# Patient Record
Sex: FEMALE | Race: WHITE | NOT HISPANIC OR LATINO | URBAN - METROPOLITAN AREA
[De-identification: names, ages, dates, MRNs, and addresses within clinical notes are randomized per-mention and may not be internally consistent; named-entity substitution may affect disease eponyms.]

---

## 2017-11-29 ENCOUNTER — OUTPATIENT (OUTPATIENT)
Dept: OUTPATIENT SERVICES | Facility: HOSPITAL | Age: 67
LOS: 1 days | End: 2017-11-29

## 2018-01-08 PROBLEM — Z00.00 ENCOUNTER FOR PREVENTIVE HEALTH EXAMINATION: Status: ACTIVE | Noted: 2018-01-08

## 2018-01-15 ENCOUNTER — APPOINTMENT (OUTPATIENT)
Dept: ORTHOPEDIC SURGERY | Facility: CLINIC | Age: 68
End: 2018-01-15
Payer: MEDICARE

## 2018-01-15 VITALS
HEIGHT: 62 IN | WEIGHT: 110 LBS | SYSTOLIC BLOOD PRESSURE: 174 MMHG | DIASTOLIC BLOOD PRESSURE: 79 MMHG | BODY MASS INDEX: 20.24 KG/M2 | HEART RATE: 61 BPM

## 2018-01-15 DIAGNOSIS — Z86.39 PERSONAL HISTORY OF OTHER ENDOCRINE, NUTRITIONAL AND METABOLIC DISEASE: ICD-10-CM

## 2018-01-15 DIAGNOSIS — G60.9 HEREDITARY AND IDIOPATHIC NEUROPATHY, UNSPECIFIED: ICD-10-CM

## 2018-01-15 DIAGNOSIS — Z86.79 PERSONAL HISTORY OF OTHER DISEASES OF THE CIRCULATORY SYSTEM: ICD-10-CM

## 2018-01-15 DIAGNOSIS — Z85.828 PERSONAL HISTORY OF OTHER MALIGNANT NEOPLASM OF SKIN: ICD-10-CM

## 2018-01-15 DIAGNOSIS — Z78.9 OTHER SPECIFIED HEALTH STATUS: ICD-10-CM

## 2018-01-15 DIAGNOSIS — Z87.39 PERSONAL HISTORY OF OTHER DISEASES OF THE MUSCULOSKELETAL SYSTEM AND CONNECTIVE TISSUE: ICD-10-CM

## 2018-01-15 PROCEDURE — 20612 ASPIRATE/INJ GANGLION CYST: CPT | Mod: RT

## 2018-01-15 PROCEDURE — 99203 OFFICE O/P NEW LOW 30 MIN: CPT | Mod: 25

## 2018-01-15 RX ORDER — VALACYCLOVIR 500 MG/1
500 TABLET, FILM COATED ORAL
Qty: 30 | Refills: 0 | Status: ACTIVE | COMMUNITY
Start: 2017-03-30

## 2018-01-15 RX ORDER — NAPROXEN 500 MG/1
500 TABLET, DELAYED RELEASE ORAL
Qty: 60 | Refills: 0 | Status: ACTIVE | COMMUNITY
Start: 2017-03-30

## 2018-01-15 RX ORDER — METOPROLOL SUCCINATE 50 MG/1
50 TABLET, EXTENDED RELEASE ORAL
Qty: 90 | Refills: 0 | Status: ACTIVE | COMMUNITY
Start: 2017-08-23

## 2018-01-15 RX ORDER — CHLORHEXIDINE GLUCONATE, 0.12% ORAL RINSE 1.2 MG/ML
0.12 SOLUTION DENTAL
Qty: 473 | Refills: 0 | Status: ACTIVE | COMMUNITY
Start: 2017-11-06

## 2018-01-15 RX ORDER — TRAZODONE HYDROCHLORIDE 50 MG/1
50 TABLET ORAL
Qty: 30 | Refills: 0 | Status: ACTIVE | COMMUNITY
Start: 2017-12-11

## 2018-01-15 RX ORDER — ATORVASTATIN CALCIUM 10 MG/1
10 TABLET, FILM COATED ORAL
Refills: 0 | Status: ACTIVE | COMMUNITY

## 2018-10-29 PROCEDURE — 99284 EMERGENCY DEPT VISIT MOD MDM: CPT

## 2018-10-29 PROCEDURE — 71045 X-RAY EXAM CHEST 1 VIEW: CPT | Mod: 26

## 2018-10-30 ENCOUNTER — OUTPATIENT (OUTPATIENT)
Dept: OUTPATIENT SERVICES | Facility: HOSPITAL | Age: 68
LOS: 1 days | End: 2018-10-30

## 2018-10-30 ENCOUNTER — INPATIENT (INPATIENT)
Facility: HOSPITAL | Age: 68
LOS: 0 days | Discharge: HOME | End: 2018-10-30
Payer: MEDICARE

## 2018-10-30 PROCEDURE — 99223 1ST HOSP IP/OBS HIGH 75: CPT

## 2018-10-30 PROCEDURE — 70551 MRI BRAIN STEM W/O DYE: CPT | Mod: 26

## 2018-10-30 PROCEDURE — 70496 CT ANGIOGRAPHY HEAD: CPT | Mod: 26

## 2018-10-30 PROCEDURE — 70498 CT ANGIOGRAPHY NECK: CPT | Mod: 26

## 2018-11-05 ENCOUNTER — TRANSCRIPTION ENCOUNTER (OUTPATIENT)
Age: 68
End: 2018-11-05

## 2019-08-27 ENCOUNTER — APPOINTMENT (OUTPATIENT)
Dept: RADIOLOGY | Facility: CLINIC | Age: 69
End: 2019-08-27
Payer: MEDICARE

## 2019-08-27 PROCEDURE — 73620 X-RAY EXAM OF FOOT: CPT | Mod: RT

## 2020-09-01 ENCOUNTER — APPOINTMENT (OUTPATIENT)
Dept: MRI IMAGING | Facility: CLINIC | Age: 70
End: 2020-09-01
Payer: MEDICARE

## 2020-09-01 PROCEDURE — 73221 MRI JOINT UPR EXTREM W/O DYE: CPT | Mod: RT

## 2020-10-10 ENCOUNTER — APPOINTMENT (OUTPATIENT)
Dept: ULTRASOUND IMAGING | Facility: CLINIC | Age: 70
End: 2020-10-10

## 2020-10-10 ENCOUNTER — APPOINTMENT (OUTPATIENT)
Dept: CT IMAGING | Facility: CLINIC | Age: 70
End: 2020-10-10

## 2020-11-23 ENCOUNTER — APPOINTMENT (OUTPATIENT)
Dept: MAMMOGRAPHY | Facility: CLINIC | Age: 70
End: 2020-11-23

## 2020-11-23 ENCOUNTER — APPOINTMENT (OUTPATIENT)
Dept: RADIOLOGY | Facility: CLINIC | Age: 70
End: 2020-11-23

## 2021-07-22 ENCOUNTER — APPOINTMENT (OUTPATIENT)
Dept: ORTHOPEDIC SURGERY | Facility: CLINIC | Age: 71
End: 2021-07-22
Payer: MEDICARE

## 2021-07-22 VITALS — TEMPERATURE: 97.4 F | HEIGHT: 62 IN | BODY MASS INDEX: 17.66 KG/M2 | WEIGHT: 96 LBS

## 2021-07-22 DIAGNOSIS — F17.200 NICOTINE DEPENDENCE, UNSPECIFIED, UNCOMPLICATED: ICD-10-CM

## 2021-07-22 PROCEDURE — 99204 OFFICE O/P NEW MOD 45 MIN: CPT | Mod: 25

## 2021-07-22 PROCEDURE — 20550 NJX 1 TENDON SHEATH/LIGAMENT: CPT | Mod: F2

## 2021-07-22 PROCEDURE — 73130 X-RAY EXAM OF HAND: CPT | Mod: RT

## 2021-07-22 PROCEDURE — 73110 X-RAY EXAM OF WRIST: CPT | Mod: RT

## 2021-07-22 RX ORDER — AMOXICILLIN 500 MG/1
500 TABLET, FILM COATED ORAL
Qty: 21 | Refills: 0 | Status: DISCONTINUED | COMMUNITY
Start: 2017-11-06 | End: 2021-07-22

## 2021-07-22 RX ORDER — AZITHROMYCIN 250 MG/1
250 TABLET, FILM COATED ORAL
Qty: 6 | Refills: 0 | Status: DISCONTINUED | COMMUNITY
Start: 2017-11-29 | End: 2021-07-22

## 2021-07-22 RX ORDER — LOSARTAN POTASSIUM 50 MG/1
50 TABLET, FILM COATED ORAL
Refills: 0 | Status: ACTIVE | COMMUNITY

## 2021-07-22 RX ORDER — IBUPROFEN 800 MG/1
800 TABLET, FILM COATED ORAL
Qty: 21 | Refills: 0 | Status: DISCONTINUED | COMMUNITY
Start: 2017-11-06 | End: 2021-07-22

## 2021-07-22 RX ORDER — AMOXICILLIN AND CLAVULANATE POTASSIUM 875; 125 MG/1; MG/1
875-125 TABLET, COATED ORAL
Qty: 20 | Refills: 0 | Status: DISCONTINUED | COMMUNITY
Start: 2017-11-29 | End: 2021-07-22

## 2021-08-12 NOTE — DISCUSSION/SUMMARY
[FreeTextEntry1] : She has findings consistent with a left middle finger trigger finger.  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  At this time, I recommended a cortisone injection for the left middle finger trigger finger.  As her symptoms are tolerable at the right wrist, I have recommended observation at this time.\par \par The patient has agreed to this plan of management and has expressed full understanding.  All questions were fully answered to the patient's satisfaction.\par \par My cumulative time spent on this patient's visit included: Preparation for the visit, review of the medical records, review of pertinent diagnostic studies, examination and counseling of the patient on the above diagnosis, treatment plan and prognosis, orders of diagnostic tests, medications and/or appropriate procedures and documentation in the medical records of today's visit.

## 2021-08-12 NOTE — PROCEDURE
Called patient to let her know paperwork is complete, and can call the office regarding lab work when she gets the chance.    [FreeTextEntry1] : -  After a discussion of risks and benefits, the patient agreed to proceed with a cortisone injection.  \par -  Side: Left \par -  Finger: Middle finger trigger finger\par -  Medications: 0.5 cc of 1% Lidocaine and 1 cc of Celestone Soluspan, 6mg/cc, using sterile technique.\par -  Patient tolerated the procedure well, without complications.\par -  Patient was told that the symptoms may worsen for a day or two, and should then begin to improve. \par -  Instructions: Patient was instructed on activity modification for the next several days.\par -  Follow-up: Within 4 weeks to assess response to the injection.

## 2021-08-12 NOTE — HISTORY OF PRESENT ILLNESS
[Right] : right hand dominant [FreeTextEntry1] : She comes in today for evaluation of a left middle finger trigger finger for the past month.  She denies any injury.  She also has complaints of right wrist pain.  She states that she fractured her right wrist for 2 months ago and was treated with a cast for 10 weeks.  She subsequently underwent therapy for 5 to 6 months with some relief.  However, she continues to have pain at her right wrist and dysfunction.  She is much more bothered, however, by her left middle finger trigger finger.\par \par I saw her in January 2018 regarding a right wrist volar ganglion cyst which was aspirated.  She states that this no longer bothers her.

## 2021-08-12 NOTE — PHYSICAL EXAM
[de-identified] : - Constitutional: This is a female in no obvious distress.  \par - Psych: Patient is alert and oriented to person, place and time.  Patient has a normal mood and affect.\par - Cardiovascular: Normal pulses throughout the upper extremities.  No significant varicosities are noted in the upper extremities. \par - Neuro: Strength and sensation are intact throughout the upper extremities.  Patient has normal coordination.\par - Respiratory:  Patient exhibits no evidence of shortness of breath or difficulty breathing.\par - Skin: No rashes, lesions, or other abnormalities are noted in the upper extremities.\par \par ---\par \par Examination of her left hand demonstrates swelling and tenderness along the A1 pulley of the middle finger.  There is triggering to there is no contracture.  She is neurovascularly intact distally.\par \par Examination of her right wrist demonstrates swelling and tenderness along the CMC and STT joints of the thumb.  She is much more tender along the STT joint dorsally.  She also has mild tenderness along the dorsal wrist capsule.  There is no tenderness along the distal radius dorsally.  There is no evidence of a trigger finger.  She can flex the digits into the palm.  She is neurovascularly intact distally. 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  There is widening of the scapholunate interval with a DISI deformity.  There is no obvious prior fracture noted.  There is MCP joint arthritis some mild arthritis of the DIP joints of the digits.

## 2021-08-19 ENCOUNTER — APPOINTMENT (OUTPATIENT)
Dept: ORTHOPEDIC SURGERY | Facility: CLINIC | Age: 71
End: 2021-08-19
Payer: MEDICARE

## 2021-08-19 PROCEDURE — 99213 OFFICE O/P EST LOW 20 MIN: CPT | Mod: 25

## 2021-08-19 PROCEDURE — 20550 NJX 1 TENDON SHEATH/LIGAMENT: CPT | Mod: F2

## 2021-08-19 NOTE — PROCEDURE
[FreeTextEntry1] : -  After a discussion of risks and benefits, the patient agreed to proceed with a cortisone injection.  \par -  Side: Left \par -  Finger: Middle finger trigger finger\par -  Medications: 0.5 cc of 1% Lidocaine and 1 cc of Celestone Soluspan, 6mg/cc, using sterile technique.\par -  Patient tolerated the procedure well, without complications.\par -  Patient was told that the symptoms may worsen for a day or two, and should then begin to improve. \par -  Instructions: Patient was instructed on activity modification for the next several days.\par -  Follow-up: According to her symptoms.

## 2021-08-19 NOTE — PHYSICAL EXAM
[de-identified] : - Constitutional: This is a female in no obvious distress.  \par - Psych: Patient is alert and oriented to person, place and time.  Patient has a normal mood and affect.\par - Cardiovascular: Normal pulses throughout the upper extremities.  No significant varicosities are noted in the upper extremities. \par - Neuro: Strength and sensation are intact throughout the upper extremities.  Patient has normal coordination.\par - Respiratory:  Patient exhibits no evidence of shortness of breath or difficulty breathing.\par - Skin: No rashes, lesions, or other abnormalities are noted in the upper extremities.\par \par ---\par \par Examination of her left hand demonstrates decreased swelling and tenderness along the A1 pulley of the middle finger.  There is mild residual triggering.  She has full flexion and extension.  She is neurovascularly intact distally.\par \par Previous examination of her right wrist demonstrated swelling and tenderness along the CMC and STT joints of the thumb.  She is much more tender along the STT joint dorsally.  She also has mild tenderness along the dorsal wrist capsule.  There is no tenderness along the distal radius dorsally.  There is no evidence of a trigger finger.  She can flex the digits into the palm.  She is neurovascularly intact distally. 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  There is widening of the scapholunate interval with a DISI deformity.  There is no obvious prior fracture noted.  There is MCP joint arthritis some mild arthritis of the DIP joints of the digits.

## 2021-08-19 NOTE — DISCUSSION/SUMMARY
[FreeTextEntry1] : I had a discussion regarding today's visit, the diagnosis and treatment recommendations and options.  We also discussed changes since the last visit.  At this time, although she is improved, given her residual symptoms, she agreed to proceed with a repeat cortisone injection.\par \par The patient has agreed to the above plan of management and has expressed full understanding.  All questions were fully answered to the patient's satisfaction.\par \par My cumulative time spent on today's visit was greater than 30 minutes and included: Preparation for the visit, review of the medical records, review of pertinent diagnostic studies, examination and counseling of the patient on the above diagnosis, treatment plan and prognosis, orders of diagnostic tests, medications and/or appropriate procedures and documentation in the medical records of today's visit.

## 2021-08-19 NOTE — HISTORY OF PRESENT ILLNESS
[FreeTextEntry1] : 4 weeks status post left middle finger trigger cortisone injection #1. 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  I recommended symptomatic treatment for the right wrist.\par \par She is doing well and is 60 to 70% improved.  She still has some residual triggering.\par \par

## 2022-01-25 ENCOUNTER — NON-APPOINTMENT (OUTPATIENT)
Age: 72
End: 2022-01-25

## 2022-02-03 ENCOUNTER — APPOINTMENT (OUTPATIENT)
Dept: ORTHOPEDIC SURGERY | Facility: CLINIC | Age: 72
End: 2022-02-03
Payer: MEDICARE

## 2022-02-03 VITALS — TEMPERATURE: 97.1 F | BODY MASS INDEX: 17.66 KG/M2 | WEIGHT: 96 LBS | HEIGHT: 62 IN

## 2022-02-03 PROCEDURE — 20550 NJX 1 TENDON SHEATH/LIGAMENT: CPT | Mod: F2

## 2022-02-03 PROCEDURE — 99213 OFFICE O/P EST LOW 20 MIN: CPT | Mod: 25

## 2022-02-03 RX ADMIN — BETAMETHASONE ACETATE AND BETAMETHASONE SODIUM PHOSPHATE 6MG/ML PRESERVATIVE FREE MG/ML: 3; 3 INJECTION, SUSPENSION EPIDURAL; INTRAMUSCULAR; INTRASPINAL; INTRATHECAL at 00:00

## 2022-02-03 RX ADMIN — Medication %: at 00:00

## 2022-02-03 NOTE — DISCUSSION/SUMMARY
[FreeTextEntry1] : I had a discussion regarding today's visit, the diagnosis and treatment recommendations and options.  We also discussed changes since the last visit.  At this time, she opted to proceed with a repeat cortisone injection into the left middle finger. She understands that as this is her third injection, if this did not provide with long-term relief, then I would not recommend repeat injections in the future.\par \par The patient has agreed to the above plan of management and has expressed full understanding.  All questions were fully answered to the patient's satisfaction.\par \par My cumulative time spent on today's visit was greater than 30 minutes and included: Preparation for the visit, review of the medical records, review of pertinent diagnostic studies, examination and counseling of the patient on the above diagnosis, treatment plan and prognosis, orders of diagnostic tests, medications and/or appropriate procedures and documentation in the medical records of today's visit.

## 2022-02-03 NOTE — END OF VISIT
[FreeTextEntry3] : This note was written by Vince Post on 02/03/2022 acting solely as a scribe for Dr. Spenser Carcamo.\par  \par All medical record entries made by the Scribe were at my, Dr. Spenser Carcamo, direction and personally dictated by me on 02/03/2022. I have personally reviewed the chart and agree that the record accurately reflects my personal performance of the history, physical exam.

## 2022-02-03 NOTE — ADDENDUM
[FreeTextEntry1] : I, Vince Post, acted solely as a scribe for Dr. Carcamo on this date on 02/03/2022.

## 2022-02-03 NOTE — HISTORY OF PRESENT ILLNESS
[FreeTextEntry1] : Greater than 5 months status post left middle finger trigger cortisone injection #2.. 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  I recommended symptomatic treatment for the right wrist in the past.\par \par She returns today with left middle finger triggering, which returned 2 weeks ago. She rates her pain a 5 out of 10 at this time.

## 2022-02-03 NOTE — PHYSICAL EXAM
[de-identified] : - Constitutional: This is a female in no obvious distress.  \par - Psych: Patient is alert and oriented to person, place and time.  Patient has a normal mood and affect.\par - Cardiovascular: Normal pulses throughout the upper extremities.  No significant varicosities are noted in the upper extremities. \par - Neuro: Strength and sensation are intact throughout the upper extremities.  Patient has normal coordination.\par - Respiratory:  Patient exhibits no evidence of shortness of breath or difficulty breathing.\par - Skin: No rashes, lesions, or other abnormalities are noted in the upper extremities.\par \par ---\par \par Examination of her left hand demonstrates decreased swelling and tenderness along the A1 pulley of the middle finger. There is no obvious triggering noted, as she cannot flex to the point of triggering. There is no triggering of the other digits. She remains neurovascularly intact distally.\par \par Previous examination of her right wrist demonstrated swelling and tenderness along the CMC and STT joints of the thumb.  She is more tender along the STT joint dorsally.  She also has mild tenderness along the dorsal wrist capsule.  There is no tenderness along the distal radius dorsally.  There is no evidence of a trigger finger.  She can flex the digits into the palm.  She is neurovascularly intact distally. 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  There is widening of the scapholunate interval with a DISI deformity.  There is no obvious prior fracture noted.  There is MCP joint arthritis some mild arthritis of the DIP joints of the digits.

## 2022-12-14 ENCOUNTER — NON-APPOINTMENT (OUTPATIENT)
Age: 72
End: 2022-12-14

## 2022-12-15 PROBLEM — M19.031 ARTHRITIS OF RIGHT WRIST: Status: ACTIVE | Noted: 2021-08-12

## 2022-12-15 PROBLEM — M19.041 ARTHRITIS OF HAND, RIGHT: Status: ACTIVE | Noted: 2021-07-22

## 2022-12-22 ENCOUNTER — APPOINTMENT (OUTPATIENT)
Dept: ORTHOPEDIC SURGERY | Facility: CLINIC | Age: 72
End: 2022-12-22
Payer: MEDICARE

## 2022-12-22 DIAGNOSIS — S63.8X1A SPRAIN OF OTHER PART OF RIGHT WRIST AND HAND, INITIAL ENCOUNTER: ICD-10-CM

## 2022-12-22 DIAGNOSIS — M65.332 TRIGGER FINGER, LEFT MIDDLE FINGER: ICD-10-CM

## 2022-12-22 DIAGNOSIS — M19.031 PRIMARY OSTEOARTHRITIS, RIGHT WRIST: ICD-10-CM

## 2022-12-22 DIAGNOSIS — M67.431 GANGLION, RIGHT WRIST: ICD-10-CM

## 2022-12-22 DIAGNOSIS — M19.041 PRIMARY OSTEOARTHRITIS, RIGHT HAND: ICD-10-CM

## 2022-12-22 PROCEDURE — 99214 OFFICE O/P EST MOD 30 MIN: CPT | Mod: 25

## 2022-12-22 PROCEDURE — 20550 NJX 1 TENDON SHEATH/LIGAMENT: CPT | Mod: F8

## 2022-12-22 NOTE — HISTORY OF PRESENT ILLNESS
[FreeTextEntry1] : She comes in today for evaluation of her right ring finger trigger finger for the past 2 weeks.  At times her pain is 5 out of 10.\par \par She has been given 3 cortisone injections at her left middle finger trigger finger, the last 1 less than 11 months ago.  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

## 2022-12-22 NOTE — PHYSICAL EXAM
[de-identified] : - Constitutional: This is a female in no obvious distress.  \par - Psych: Patient is alert and oriented to person, place and time.  Patient has a normal mood and affect.\par - Cardiovascular: Normal pulses throughout the upper extremities.  No significant varicosities are noted in the upper extremities. \par - Neuro: Strength and sensation are intact throughout the upper extremities.  Patient has normal coordination.\par - Respiratory:  Patient exhibits no evidence of shortness of breath or difficulty breathing.\par - Skin: No rashes, lesions, or other abnormalities are noted in the upper extremities.\par \par ---\par \par Examination of her right hand ring finger demonstrates swelling and tenderness along the A1 pulley.  There is no triggering, as she cannot flex to the point of triggering.  There is no triggering of the other digits.  She states that it does trigger and lock.  She is neurovascular intact distally. 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  There is widening of the scapholunate interval with a DISI deformity.  There is no obvious prior fracture noted.  There is MCP joint arthritis some mild arthritis of the DIP joints of the digits.

## 2022-12-22 NOTE — DISCUSSION/SUMMARY
[FreeTextEntry1] : She has findings consistent with a right ring finger trigger finger.\par \par I had a discussion regarding today's visit, the diagnosis and treatment recommendations and options.  We also discussed changes since the last visit.  At this time, I recommended a cortisone injection.\par \par The patient has agreed to the above plan of management and has expressed full understanding.  All questions were fully answered to the patient's satisfaction.\par \par My cumulative time spent on today's visit was greater than 30 minutes and included: Preparation for the visit, review of the medical records, review of pertinent diagnostic studies, examination and counseling of the patient on the above diagnosis, treatment plan and prognosis, orders of diagnostic tests, medications and/or appropriate procedures and documentation in the medical records of today's visit.

## 2022-12-22 NOTE — PROCEDURE
[FreeTextEntry1] : -  After a discussion of risks and benefits, the patient agreed to proceed with a cortisone injection.  \par -  Side: Right \par -  Finger: Ring finger trigger finger\par -  Medications: 0.5 cc of 1% Lidocaine and 1 cc of Celestone Soluspan, 6mg/cc, using sterile technique.\par -  Patient tolerated the procedure well, without complications.\par -  Patient was told that the symptoms may worsen for a day or two, and should then begin to improve. \par -  Instructions: Patient was instructed on activity modification for the next several days.\par -  Follow-up: Within 4 weeks to assess response to the injection.

## 2022-12-30 RX ORDER — LIDOCAINE HYDROCHLORIDE 10 MG/ML
1 INJECTION, SOLUTION INFILTRATION; PERINEURAL
Refills: 0 | Status: COMPLETED | OUTPATIENT
Start: 2022-12-30

## 2022-12-30 RX ORDER — BETAMETHA AC,SOD PHOS/WATER/PF 6 MG/ML
6 (3-3) VIAL (ML) INJECTION
Qty: 1 | Refills: 0 | Status: COMPLETED | OUTPATIENT
Start: 2022-12-30

## 2022-12-30 RX ADMIN — Medication %: at 00:00

## 2022-12-30 RX ADMIN — Medication MG/ML: at 00:00

## 2023-06-07 ENCOUNTER — NON-APPOINTMENT (OUTPATIENT)
Age: 73
End: 2023-06-07

## 2023-06-15 ENCOUNTER — APPOINTMENT (OUTPATIENT)
Dept: ORTHOPEDIC SURGERY | Facility: CLINIC | Age: 73
End: 2023-06-15
Payer: MEDICARE

## 2023-06-15 DIAGNOSIS — S63.633A SPRAIN OF INTERPHALANGEAL JOINT OF LEFT MIDDLE FINGER, INITIAL ENCOUNTER: ICD-10-CM

## 2023-06-15 PROCEDURE — 99214 OFFICE O/P EST MOD 30 MIN: CPT | Mod: 25

## 2023-06-15 PROCEDURE — 20550 NJX 1 TENDON SHEATH/LIGAMENT: CPT | Mod: RT

## 2023-06-15 RX ORDER — BETAMETHA AC,SOD PHOS/WATER/PF 6 MG/ML
6 (3-3) VIAL (ML) INJECTION
Qty: 1 | Refills: 0 | Status: COMPLETED | OUTPATIENT
Start: 2023-06-15

## 2023-06-15 RX ORDER — LIDOCAINE HYDROCHLORIDE 10 MG/ML
1 INJECTION, SOLUTION INFILTRATION; PERINEURAL
Refills: 0 | Status: COMPLETED | OUTPATIENT
Start: 2023-06-15

## 2023-06-15 RX ADMIN — LIDOCAINE HYDROCHLORIDE 0.5 %: 10 INJECTION, SOLUTION INFILTRATION; PERINEURAL at 00:00

## 2023-06-15 RX ADMIN — BETAMETHASONE ACETATE AND BETAMETHASONE SODIUM PHOSPHATE 1 MG/ML: 3; 3 INJECTION, SUSPENSION INTRA-ARTICULAR; INTRALESIONAL; INTRAMUSCULAR; SOFT TISSUE at 00:00

## 2023-06-15 NOTE — DISCUSSION/SUMMARY
[FreeTextEntry1] : I had a discussion regarding today's visit, the diagnosis and treatment recommendations and options.  We also discussed changes since the last visit.  \par \par With regard to the recurrent right ring finger trigger finger, I recommended a a repeat cortisone injection.  She understands that if this does not provide her with long-term relief, then she may require surgical release in the future.\par \par With regard to the left middle finger PIP joint, she sustained a mild sprain 5 days ago.  I see no indication for x-rays.  She will follow-up if her symptoms do not resolve over the next 2 to 3 weeks.\par \par The patient has agreed to the above plan of management and has expressed full understanding.  All questions were fully answered to the patient's satisfaction.\par \par My cumulative time spent on today's visit included: Preparation for the visit, review of the medical records, review of pertinent diagnostic studies, examination and counseling of the patient on the above diagnosis, treatment plan and prognosis, orders of diagnostic tests, medications and/or appropriate procedures and documentation in the medical records of today's visit.

## 2023-06-15 NOTE — HISTORY OF PRESENT ILLNESS
[FreeTextEntry1] : Almost 6 months status post right ring finger trigger cortisone injection #1.\par \par She returns today, with right ring finger and left middle finger pain that started 5 days ago. Specifically, she jammed her left middle finger in her sliding door. Her pain level is 6/10 on her left and 8/10 on her right which she has been taking Advil for. Furthermore, her right ring finger bends and gets suck whereas her left middle finger is getting better. She denies numbness and tingling. Moreover, her  strength has weakened on the right.\par \par She has been given 3 cortisone injections at her left middle finger trigger finger.  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

## 2023-06-15 NOTE — END OF VISIT
[FreeTextEntry3] : This note was written by Augustina Turner on 06/15/2023 acting solely as a scribe for Dr. Spenser Carcamo.\par  \par All medical record entries made by the Scribe were at my, Dr. Spenser Carcamo, direction and personally dictated by me on 06/15/2023. I have personally reviewed the chart and agree that the record accurately reflects my personal performance of the history, physical exam, assessment and plan.		\par

## 2023-06-15 NOTE — ADDENDUM
[FreeTextEntry1] : I, Augustina Turner, acted solely as a scribe for Dr. Carcamo on this date on 06/15/2023.		\par

## 2023-06-15 NOTE — PHYSICAL EXAM
[de-identified] : - Constitutional: This is a female in no obvious distress.  \par - Psych: Patient is alert and oriented to person, place and time.  Patient has a normal mood and affect.\par - Cardiovascular: Normal pulses throughout the upper extremities.  No significant varicosities are noted in the upper extremities. \par - Neuro: Strength and sensation are intact throughout the upper extremities.  Patient has normal coordination.\par - Respiratory:  Patient exhibits no evidence of shortness of breath or difficulty breathing.\par - Skin: No rashes, lesions, or other abnormalities are noted in the upper extremities.\par \par ---\par \par Examination of her right hand ring finger demonstrates swelling and tenderness along the A1 pulley.  There is triggering.  There is no triggering of the other digits.   She is neurovascular intact distally.\par \par Examination of her left hand demonstrates very minimal swelling at the middle finger PIP joint there is mild tenderness along the radial collateral ligament.  There is no instability.  She has full flexion and extension.  She is neurovascularly intact distally. 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  There is widening of the scapholunate interval with a DISI deformity.  There is no obvious prior fracture noted.  There is MCP joint arthritis some mild arthritis of the DIP joints of the digits.

## 2023-06-15 NOTE — PROCEDURE
[FreeTextEntry1] : -After a discussion of risks and benefits, the patient agreed to proceed with a cortisone injection.  \par -Side: Right\par -Finger: ring trigger finger\par -Medications: 0.5 cc of 1% Lidocaine and 1 cc of Celestone Soluspan, 6mg/cc, using sterile technique.\par -Patient tolerated the procedure well, without complications.\par -Patient was told that the symptoms may worsen for a day or two, and should then begin to improve. \par -Instructions: Patient was instructed on activity modification for the next several days.\par -Follow-up: According to her symptoms.\par \par 	\par

## 2023-09-22 ENCOUNTER — APPOINTMENT (OUTPATIENT)
Dept: ORTHOPEDIC SURGERY | Facility: CLINIC | Age: 73
End: 2023-09-22
Payer: MEDICARE

## 2023-09-22 DIAGNOSIS — M65.341 TRIGGER FINGER, RIGHT RING FINGER: ICD-10-CM

## 2023-09-22 PROCEDURE — 99204 OFFICE O/P NEW MOD 45 MIN: CPT | Mod: 25

## 2023-09-22 PROCEDURE — 20550 NJX 1 TENDON SHEATH/LIGAMENT: CPT | Mod: RT
